# Patient Record
Sex: FEMALE | Race: BLACK OR AFRICAN AMERICAN | NOT HISPANIC OR LATINO | URBAN - METROPOLITAN AREA
[De-identification: names, ages, dates, MRNs, and addresses within clinical notes are randomized per-mention and may not be internally consistent; named-entity substitution may affect disease eponyms.]

---

## 2019-02-20 ENCOUNTER — OFFICE VISIT - RIVER FALLS (OUTPATIENT)
Dept: FAMILY MEDICINE | Facility: CLINIC | Age: 19
End: 2019-02-20

## 2020-03-02 ENCOUNTER — AMBULATORY - RIVER FALLS (OUTPATIENT)
Dept: FAMILY MEDICINE | Facility: CLINIC | Age: 20
End: 2020-03-02

## 2020-03-04 ENCOUNTER — AMBULATORY - RIVER FALLS (OUTPATIENT)
Dept: FAMILY MEDICINE | Facility: CLINIC | Age: 20
End: 2020-03-04

## 2020-03-04 ENCOUNTER — OFFICE VISIT - RIVER FALLS (OUTPATIENT)
Dept: FAMILY MEDICINE | Facility: CLINIC | Age: 20
End: 2020-03-04

## 2022-02-12 VITALS
HEART RATE: 79 BPM | SYSTOLIC BLOOD PRESSURE: 117 MMHG | WEIGHT: 144.2 LBS | DIASTOLIC BLOOD PRESSURE: 70 MMHG | TEMPERATURE: 98.4 F

## 2022-02-12 VITALS
WEIGHT: 149 LBS | SYSTOLIC BLOOD PRESSURE: 120 MMHG | OXYGEN SATURATION: 99 % | HEART RATE: 79 BPM | DIASTOLIC BLOOD PRESSURE: 70 MMHG

## 2022-02-16 NOTE — PROGRESS NOTES
Patient:   CHARLEY SOUSA            MRN: [id number]            FIN: [id number]               Age:   19 years     Sex:  Female     :  2000   Associated Diagnoses:   Chronic left shoulder pain   Author:   Uriel Mendoza MD      Visit Information      Date of Service: 2020 03:47 pm  Performing Location: [facility]  Encounter#: [id number]      Chief Complaint   3/4/2020 3:49 PM CST     Left shoulder pain.  Hx of injury to same shoulder multiple times.        History of Present Illness   Patient is here with shoulder problems.  She notes she injured her left shoulder in high school playing basketball when someone came down hard on her left shoulder.  She had a lot of physical therapy at the time and is always had a little bit of discomfort since that last year she fell off a horse landing on her left shoulder again ago and had significant pain discomfort she had an AC sprain with negative x-rays it bothers her worse over since that time.  Especially overhead work and she notes is been really acting up over the last 2 weeks she does work at [employer] has to work with her hands of her head a lot.  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  She is from [place].         Review of Systems   Constitutional:  Negative except as documented in history of present illness.    Neurologic:  Negative.       Health Status   Allergies:    Allergic Reactions (Selected)  Severity Not Documented  Azithromycin (Hives)  Penicillin (No reactions were documented)   Medications:  (Selected)   Documented Medications  Documented  albuterol 90 mcg/inh inhalation aerosol: 2 puff(s), Inhale, q4-6 hrs, 0 Refill(s), Type: Maintenance      Histories   Past Medical History:    No active or resolved past medical history items have been selected or recorded.   Family History:    No family history items have been selected or recorded.   Procedure history:    No active procedure history items have been selected or  recorded.   Social History:        Alcohol Assessment            Never      Tobacco Assessment            Never (less than 100 in lifetime)      Substance Abuse Assessment            Never      Employment and Education Assessment            Part time, Work/School description: [employer].      Home and Environment Assessment            Marital status: Single.  Risks in environment: Pets/Animal exposure, Stairs.      Nutrition and Health Assessment            Type of diet: Regular.      Exercise and Physical Activity Assessment            Exercise frequency: Daily.  Exercise type: Bicycling, Walking.      Sexual Assessment            Sexually active: No.  Identifies as female, Sexual orientation: Straight or heterosexual.        Physical Examination   Vital Signs   3/4/2020 3:49 PM CST Temperature Tympanic 98.4 DegF    Peripheral Pulse Rate 79 bpm    HR Method Electronic    Systolic Blood Pressure 117 mmHg    Diastolic Blood Pressure 70 mmHg    Mean Arterial Pressure 86 mmHg    BP Method Electronic      Measurements from flowsheet : Measurements   3/4/2020 3:49 PM CST     Weight Measured - Standard                144.2 lb     General:  Alert and oriented, No acute distress.    Musculoskeletal:  Left shoulder reveals about 90 degrees of flexion and abduction she can get just a little bit past that but has a lot of pain getting there.  There is some crepitus when she was past that point as well.  She has intact biceps triceps brachial radialis reflexes distal CMS is intact she has increased pain no weakness we stress her rotator cuff she has positive impingement as well.  .    Neurologic:  Alert, Oriented.       Impression and Plan   Diagnosis     Chronic left shoulder pain (NNI85-MR M25.512).     Course:  Not progressing as expected.    Plan:  Patient with acute on chronic shoulder pain certainly history of multiple injuries in the past some of significance we will trial physical therapy first if we do not see next  improvement over the next 4 to 6 weeks consider MRI to define the abnormality better.  She does have some pain with neck flexion and rotation into her left shoulder there may be a cervical component as well we will points to that depending on how she does with her shoulder  .

## 2022-02-16 NOTE — NURSING NOTE
Depression Screening Entered On:  2/21/2019 8:11 AM CST    Performed On:  2/20/2019 8:10 AM CST by Tamia Lee               Depression Screening   Feeling Down, Depressed, Hopeless :   Several days   Little Interest - Pleasure in Activities :   Several days   Initial Depression Screen Score :   2    Trouble Falling or Staying Asleep :   More than half the days   Feeling Tired or Little Energy :   More than half the days   Poor Appetite or Overeating :   More than half the days   Feeling Bad About Yourself :   Several days   Trouble Concentrating :   More than half the days   Moving or Speaking Slowly :   Several days   Thoughts Better Off Dead or Hurting Self :   Not at all   Detailed Depression Screen Score :   10    Total Depression Screen Score :   12    AGUSTIN Difficulty with Work, Home, Others :   Somewhat difficult   Tamia Lee - 2/21/2019 8:10 AM CST

## 2022-02-16 NOTE — NURSING NOTE
PPD Reading POC Entered On:  3/4/2020 3:46 PM CST    Performed On:  3/4/2020 3:45 PM CST by Prerna Turpin RN               PPD Reading   PPD mm of Induration :   0 mm   PPD Interpretation :   Negative   PPD Completion Status :   Completed   PPD Result Comment :   Patient given copy of mantoux form   Prerna Turpin RN - 3/4/2020 3:46 PM CST

## 2022-02-16 NOTE — NURSING NOTE
Comprehensive Intake Entered On:  2/20/2019 1:49 PM CST    Performed On:  2/20/2019 1:46 PM CST by Su Cleary MA               Summary   Chief Complaint :   Left shoulder pain, she fell off of her horse yesterday and she has left shoulder pain, she had issues with left shoulder in high school    Weight Measured :   149 lb(Converted to: 149 lb 0 oz, 67.59 kg)    Systolic Blood Pressure :   120 mmHg   Diastolic Blood Pressure :   70 mmHg   Mean Arterial Pressure :   87 mmHg   Peripheral Pulse Rate :   79 bpm   BP Site :   Right arm   Pulse Site :   Radial artery   Oxygen Saturation :   99 %   Su Cleary MA - 2/20/2019 1:46 PM CST   Health Status   Allergies Verified? :   Yes   Medication History Verified? :   Yes   Medical History Verified? :   No   Pre-Visit Planning Status :   N/A   Tobacco Use? :   Never smoker   Su Cleary MA - 2/20/2019 1:46 PM CST   Consents   Consent for Immunization Exchange :   Consent Granted   Consent for Immunizations to Providers :   Consent Granted   Su Cleary MA - 2/20/2019 1:46 PM CST   Meds / Allergies   (As Of: 2/20/2019 1:49:04 PM CST)   Allergies (Active)   azithromycin  Estimated Onset Date:   Unspecified ; Reactions:   hives ; Created By:   Su Cleary MA; Reaction Status:   Active ; Category:   Drug ; Substance:   azithromycin ; Type:   Allergy ; Updated By:   Su Cleary MA; Reviewed Date:   2/20/2019 1:47 PM CST      penicillin  Estimated Onset Date:   Unspecified ; Created By:   Su Cleary MA; Reaction Status:   Active ; Category:   Drug ; Substance:   penicillin ; Type:   Allergy ; Updated By:   Su Cleary MA; Reviewed Date:   2/20/2019 1:47 PM CST        Medication List   (As Of: 2/20/2019 1:49:04 PM CST)   Home Meds    albuterol  :   albuterol ; Status:   Documented ; Ordered As Mnemonic:   albuterol 90 mcg/inh inhalation aerosol ; Simple Display Line:   2 puff(s), Inhale, q4-6 hrs, 0 Refill(s)  ; Catalog Code:   albuterol ; Order Dt/Tm:   2/20/2019 1:47:31 PM

## 2022-02-16 NOTE — NURSING NOTE
Comprehensive Intake Entered On:  3/4/2020 3:51 PM CST    Performed On:  3/4/2020 3:49 PM CST by Sri Hood               Summary   Chief Complaint :   Left shoulder pain.  Hx of injury to same shoulder multiple times.    Weight Measured :   144.2 lb(Converted to: 144 lb 3 oz, 65.41 kg)    Systolic Blood Pressure :   117 mmHg   Diastolic Blood Pressure :   70 mmHg   Mean Arterial Pressure :   86 mmHg   Peripheral Pulse Rate :   79 bpm   BP Method :   Electronic   HR Method :   Electronic   Temperature Tympanic :   98.4 DegF(Converted to: 36.9 DegC)    Sri Hood - 3/4/2020 3:49 PM CST   Health Status   Allergies Verified? :   Yes   Medication History Verified? :   Yes   Tobacco Use? :   Never smoker   Sri Hood - 3/4/2020 3:49 PM CST   Meds / Allergies   (As Of: 3/4/2020 3:51:30 PM CST)   Allergies (Active)   azithromycin  Estimated Onset Date:   Unspecified ; Reactions:   hives ; Created By:   Su Cleary MA; Reaction Status:   Active ; Category:   Drug ; Substance:   azithromycin ; Type:   Allergy ; Updated By:   Su Cleary MA; Reviewed Date:   2/23/2019 5:18 PM CST      penicillin  Estimated Onset Date:   Unspecified ; Created By:   Su Cleary MA; Reaction Status:   Active ; Category:   Drug ; Substance:   penicillin ; Type:   Allergy ; Updated By:   Su Cleary MA; Reviewed Date:   2/23/2019 5:18 PM CST        Medication List   (As Of: 3/4/2020 3:51:30 PM CST)   Home Meds    albuterol  :   albuterol ; Status:   Documented ; Ordered As Mnemonic:   albuterol 90 mcg/inh inhalation aerosol ; Simple Display Line:   2 puff(s), Inhale, q4-6 hrs, 0 Refill(s) ; Catalog Code:   albuterol ; Order Dt/Tm:   2/20/2019 1:47:31 PM CST

## 2022-02-16 NOTE — NURSING NOTE
PPD Administration POC Entered On:  3/2/2020 3:32 PM CST    Performed On:  3/2/2020 3:31 PM CST by Prerna Turpin RN               PPD Administration   PPD Insertion Site :   Right forearm   POC Test Comments :   Wheal created   PPD Amount Administered (mL) :   0.1 mL   Prerna Turpin RN 3/2/2020 3:31 PM CST   Details   Collection Date :   3/2/2020 3:30 PM CST   Expiration Date :   6/12/2022 CDT   Lot#/Manufacture :   [id number]    :   sanofi pasteur Sprague RN, Tara D - 3/2/2020 3:31 PM CST

## 2022-02-16 NOTE — NURSING NOTE
CAGE Assessment Entered On:  2/21/2019 8:11 AM CST    Performed On:  2/20/2019 8:10 AM CST by Tamia Lee               Assessment   Have you ever felt you should cut down on your drinking :   No   Have people annoyed you by criticizing your drinking :   No   Have you ever felt bad or guilty about your drinking :   No   Have you ever taken a drink first thing in the morning to steady your nerves or get rid of a hangover (Eye-opener) :   No   CAGE Score :   0    Tamia Lee - 2/21/2019 8:10 AM CST

## 2022-02-16 NOTE — PROGRESS NOTES
Patient:   CHARLEY SOUSA            MRN: [id number]            FIN: [id number]               Age:   18 years     Sex:  Female     :  2000   Associated Diagnoses:   Strain of acromioclavicular joint   Author:   Chauncey Carrera MD      Visit Information      Date of Service: 2019 01:15 pm  Performing Location: [facility]  Encounter#: [id number]      Primary Care Provider (PCP):  NONE ,       Referring Provider:  Chauncey Carrera MD    NPI# [id number]      Subjective   Chief complaint 2019 1:46 PM CST    Left shoulder pain, she fell off of her horse yesterday and she has left shoulder pain, she had issues with left shoulder in high school  .  Additional information as above    Chest pain if he tries to lift her arm up overhead reaching out in front of her choice of the acromioclavicular area when she fell she did not hit her head she has no neck pain no headache no back pain.        Health Status   Allergies:    Allergic Reactions (Selected)  Severity Not Documented  Azithromycin (Hives)  Penicillin (No reactions were documented)   Medications:  (Selected)   Documented Medications  Documented  albuterol 90 mcg/inh inhalation aerosol: 2 puff(s), Inhale, q4-6 hrs, 0 Refill(s), Type: Maintenance      Objective   Vital Signs   2019 1:46 PM CST Peripheral Pulse Rate 79 bpm    Pulse Site Radial artery    Systolic Blood Pressure 120 mmHg    Diastolic Blood Pressure 70 mmHg    Mean Arterial Pressure 87 mmHg    BP Site Right arm    Oxygen Saturation 99 %      Measurements from flowsheet : Measurements   2019 1:46 PM CST    Weight Measured           149 lb     General:  Alert and oriented, No acute distress.    Respiratory:  Lungs are clear to auscultation, Respirations are non-labored.    Cardiovascular:  Normal rate, Regular rhythm.    Musculoskeletal:  there is some tenderness right over the before meals joint but no gross deformity or bruising.    Integumentary:  Warm.     Psychiatric:  Cooperative, Appropriate mood & affect.       Results Review   Results review   x-ray the shoulder without any acute findings, radiology will over read, results discussed with patient      Impression and Plan   Assessment and Plan:          Diagnosis: Strain of acromioclavicular joint (QEV11-SB S46.919A).         Course: I discussed taping with physio tape  regular icing  Sling as needed for comfort  It could take as much is 8 weeks and today she is using her arm more aggressively.

## 2022-02-16 NOTE — PROGRESS NOTES
Patient:   CHARLEY SOUSA            MRN: [id number]            FIN: [id number]               Age:   18 years     Sex:  Female     :  2000   Associated Diagnoses:   None   Author:   Chauncey Carrera MD       -   Today's date:  2019 2:28:00 PM .        -   To whom it may concern:        This patient is currently under my care.  He/ she may return to school, with the following restrictions: use of sling and minimal use of left arm due to A-C separation.  Please contact me if you have any questions or concerns.      -   Sincerely,